# Patient Record
Sex: FEMALE | Race: WHITE | NOT HISPANIC OR LATINO | URBAN - NONMETROPOLITAN AREA
[De-identification: names, ages, dates, MRNs, and addresses within clinical notes are randomized per-mention and may not be internally consistent; named-entity substitution may affect disease eponyms.]

---

## 2024-05-14 ENCOUNTER — OFFICE (OUTPATIENT)
Dept: URBAN - NONMETROPOLITAN AREA CLINIC 1 | Facility: CLINIC | Age: 74
End: 2024-05-14
Payer: COMMERCIAL

## 2024-05-14 VITALS
DIASTOLIC BLOOD PRESSURE: 90 MMHG | HEIGHT: 66 IN | SYSTOLIC BLOOD PRESSURE: 144 MMHG | SYSTOLIC BLOOD PRESSURE: 150 MMHG | DIASTOLIC BLOOD PRESSURE: 96 MMHG | WEIGHT: 166 LBS | HEART RATE: 88 BPM

## 2024-05-14 DIAGNOSIS — I10 ESSENTIAL (PRIMARY) HYPERTENSION: ICD-10-CM

## 2024-05-14 DIAGNOSIS — N18.9 CHRONIC KIDNEY DISEASE, UNSPECIFIED: ICD-10-CM

## 2024-05-14 DIAGNOSIS — Z86.010 PERSONAL HISTORY OF COLONIC POLYPS: ICD-10-CM

## 2024-05-14 DIAGNOSIS — R19.8 OTHER SPECIFIED SYMPTOMS AND SIGNS INVOLVING THE DIGESTIVE S: ICD-10-CM

## 2024-05-14 DIAGNOSIS — K21.9 GASTRO-ESOPHAGEAL REFLUX DISEASE WITHOUT ESOPHAGITIS: ICD-10-CM

## 2024-05-14 DIAGNOSIS — F41.9 ANXIETY DISORDER, UNSPECIFIED: ICD-10-CM

## 2024-05-14 DIAGNOSIS — E03.9 HYPOTHYROIDISM, UNSPECIFIED: ICD-10-CM

## 2024-05-14 DIAGNOSIS — K92.1 MELENA: ICD-10-CM

## 2024-05-14 DIAGNOSIS — Z79.02 LONG TERM (CURRENT) USE OF ANTITHROMBOTICS/ANTIPLATELETS: ICD-10-CM

## 2024-05-14 DIAGNOSIS — E78.5 HYPERLIPIDEMIA, UNSPECIFIED: ICD-10-CM

## 2024-05-14 PROCEDURE — 99204 OFFICE O/P NEW MOD 45 MIN: CPT | Performed by: NURSE PRACTITIONER

## 2024-05-14 NOTE — SERVICEHPINOTES
She comes in today requesting colonoscopy.   she is having a lot of lower GI upset, described as intermittent diarrhea but also some constipation.  She has always had some trouble with IBS D, but her symptoms are worse these past several months.  She sees blood with her stool, especially after straining for bowel movements. She saw BRIANNE Barrios NP at the  and was scheduled to have a colonoscopy, but had troubles with the scheduling.             She does not complain of any upper GI symptoms.  No heartburn or dysphagia.  No dark stools or unintended weight loss.  Her chronic illnesses include anxiety, carotid artery stenosis, coronary artery disease, previous adenomatous colon polyps, GERD, hyperlipidemia, hypertension, h, ischemic heart disease, hyperparathyroidism, and chronic kidney disease.  She has had CABG, and takes Plavix.  She will be asked to hold it 5 days prior to her procedure.br
itz  When she saw Heather to schedule colonoscopy previously she was given cardiac clearance. 
br Dr. Saavedra's Note To File: Documented 04/02/2024 at 07:20 Patient was last evaluated in our office 12/11/2023. If there has been no significant clinical change cardiac risk for colonoscopy/EGD with dilatation and for stopping clopidogrel prior to the procedure is low but not zero. It should be noted that stopping anti-platelet agents as short time as feasible prior to the procedure is recommended. Clopidogrel should be re-initiated after the procedure as soon as feasible. Aspirin should be continued unless absolutely necessary to discontinue. At last visit there were no obvious modifiable risk factors that will reduce cardiac risk. The determination of whether the procedure benefit outweighs the procedure risk is left of the procedure . Patient can be re-evaluated in our office prior to proposed procedure if clinically indicated. 
br
br   EGD-dil/ colonoscopy 6/18/20 by Dr Catherine because of PHP and diarrhea-
br -EGDFINDINGS/POSTOPERATIVE DIAGNOSIS: No gross esophagitis. Minimal sliding hiatal hernia. Pinch at 39cm. No tissue disruption after 54 and 60 Hebrew dilation.
br-COLON FINDINGS/POSTOPERATIVE DIAGNOSIS: Single small polyp in the sigmoid region. No gross inflammatory changes. Medium internal hemorrhoids.
br-Final Pathologic Diagnosis:br  1.  colon and terminal ileum, random biopsies:br   Fragments of benign colonic and small intestinal mucosa. br  Fragments of small intestine show no significant histopathologic findings. br  Benign colonic mucosa shows mucosal prolapse-associated changes. br  2.  small sessile sigmoid colon polyp, endoscopic biopsy:br   Tubular adenoma, inflamed. br br

## 2024-05-14 NOTE — SERVICENOTES
Hold Plavix 5 days prior to procedure.
The bowel prep was prescribed and instructions were provided.
The risks for colonoscopy were discussed with her and she agrees to proceed.

## 2025-07-10 ENCOUNTER — OFFICE (OUTPATIENT)
Dept: URBAN - NONMETROPOLITAN AREA CLINIC 1 | Facility: CLINIC | Age: 75
End: 2025-07-10
Payer: MEDICARE

## 2025-07-10 VITALS
OXYGEN SATURATION: 97 % | WEIGHT: 158 LBS | SYSTOLIC BLOOD PRESSURE: 118 MMHG | HEART RATE: 75 BPM | DIASTOLIC BLOOD PRESSURE: 72 MMHG | HEIGHT: 63 IN

## 2025-07-10 DIAGNOSIS — R13.10 DYSPHAGIA, UNSPECIFIED: ICD-10-CM

## 2025-07-10 DIAGNOSIS — R93.5 ABNORMAL FINDINGS ON DIAGNOSTIC IMAGING OF OTHER ABDOMINAL R: ICD-10-CM

## 2025-07-10 PROCEDURE — 99214 OFFICE O/P EST MOD 30 MIN: CPT | Performed by: INTERNAL MEDICINE

## 2025-07-10 NOTE — SERVICENOTES
We discussed risks and benefits of EGD and Flex Sig
.  I explained the risk of bleeding, infection, perforation requiring emergent surgery as well as anesthesia related complications including heart attack, stroke, or pneumonia.  Patient voiced understanding and agrees to proceed.

## 2025-07-10 NOTE — SERVICEHPINOTES
Patient presents for lower abdominal and rectal pain which has been going on for the past year.  She is complains that this pain is constant.  Describes it as in her lower abdomen, rectum or vagina and she is seen 5 different doctors for evaluation of this pain.  She states it prevents her from sleeping.  It is at times severe.  She notices no blood with her bowel movements  Has no significant diarrhea or constipation.  Bowel movements have no effect on this pain..
br   She saw her gynecologist Dr. Kidd 1 year ago, she has seen a urologist and has been to the emergency room twice because of this pain.
br   While at [facility] Emergency room she is undergone 2 CT scans 06/02/2025 shows an unremarkable CT of the abdomen and pelvis, on 06/14/2025 she had a CT scan with contrast and there was suspected thickening at the rectosigmoid junction.
br   She also does complain of intermittent dysphagia.  Barium esophagram did show suggestion of distal esophageal peptic stricture with barium tablet  delayed getting through the GE junction

## 2025-08-21 ENCOUNTER — AMBULATORY SURGICAL CENTER (OUTPATIENT)
Dept: URBAN - NONMETROPOLITAN AREA SURGERY 1 | Facility: SURGERY | Age: 75
End: 2025-08-21
Payer: MEDICARE

## 2025-08-21 ENCOUNTER — OFFICE (OUTPATIENT)
Dept: URBAN - METROPOLITAN AREA PATHOLOGY 15 | Facility: PATHOLOGY | Age: 75
End: 2025-08-21
Payer: MEDICARE

## 2025-08-21 VITALS
DIASTOLIC BLOOD PRESSURE: 82 MMHG | SYSTOLIC BLOOD PRESSURE: 157 MMHG | DIASTOLIC BLOOD PRESSURE: 87 MMHG | OXYGEN SATURATION: 98 % | OXYGEN SATURATION: 95 % | OXYGEN SATURATION: 100 % | HEART RATE: 62 BPM | OXYGEN SATURATION: 95 % | TEMPERATURE: 98.3 F | SYSTOLIC BLOOD PRESSURE: 159 MMHG | RESPIRATION RATE: 13 BRPM | RESPIRATION RATE: 15 BRPM | HEART RATE: 58 BPM | TEMPERATURE: 98.2 F | WEIGHT: 159 LBS | OXYGEN SATURATION: 99 % | DIASTOLIC BLOOD PRESSURE: 77 MMHG | SYSTOLIC BLOOD PRESSURE: 159 MMHG | SYSTOLIC BLOOD PRESSURE: 153 MMHG | DIASTOLIC BLOOD PRESSURE: 72 MMHG | RESPIRATION RATE: 13 BRPM | HEART RATE: 61 BPM | HEART RATE: 56 BPM | SYSTOLIC BLOOD PRESSURE: 157 MMHG | SYSTOLIC BLOOD PRESSURE: 140 MMHG | HEART RATE: 62 BPM | DIASTOLIC BLOOD PRESSURE: 72 MMHG | RESPIRATION RATE: 17 BRPM | DIASTOLIC BLOOD PRESSURE: 87 MMHG | HEIGHT: 63 IN | SYSTOLIC BLOOD PRESSURE: 142 MMHG | HEIGHT: 63 IN | TEMPERATURE: 98.2 F | SYSTOLIC BLOOD PRESSURE: 165 MMHG | RESPIRATION RATE: 12 BRPM | SYSTOLIC BLOOD PRESSURE: 159 MMHG | SYSTOLIC BLOOD PRESSURE: 140 MMHG | DIASTOLIC BLOOD PRESSURE: 82 MMHG | HEART RATE: 56 BPM | SYSTOLIC BLOOD PRESSURE: 142 MMHG | SYSTOLIC BLOOD PRESSURE: 153 MMHG | HEART RATE: 53 BPM | RESPIRATION RATE: 12 BRPM | RESPIRATION RATE: 17 BRPM | TEMPERATURE: 98.3 F | TEMPERATURE: 98.2 F | SYSTOLIC BLOOD PRESSURE: 165 MMHG | HEART RATE: 62 BPM | DIASTOLIC BLOOD PRESSURE: 87 MMHG | WEIGHT: 159 LBS | SYSTOLIC BLOOD PRESSURE: 153 MMHG | OXYGEN SATURATION: 99 % | DIASTOLIC BLOOD PRESSURE: 88 MMHG | TEMPERATURE: 98.3 F | OXYGEN SATURATION: 95 % | DIASTOLIC BLOOD PRESSURE: 77 MMHG | DIASTOLIC BLOOD PRESSURE: 88 MMHG | HEIGHT: 63 IN | RESPIRATION RATE: 12 BRPM | OXYGEN SATURATION: 98 % | HEART RATE: 58 BPM | HEART RATE: 58 BPM | HEART RATE: 53 BPM | RESPIRATION RATE: 15 BRPM | HEART RATE: 61 BPM | WEIGHT: 159 LBS | OXYGEN SATURATION: 100 % | HEART RATE: 56 BPM | OXYGEN SATURATION: 98 % | DIASTOLIC BLOOD PRESSURE: 82 MMHG | SYSTOLIC BLOOD PRESSURE: 157 MMHG | RESPIRATION RATE: 15 BRPM | SYSTOLIC BLOOD PRESSURE: 142 MMHG | HEART RATE: 53 BPM | SYSTOLIC BLOOD PRESSURE: 140 MMHG | DIASTOLIC BLOOD PRESSURE: 72 MMHG | RESPIRATION RATE: 13 BRPM | OXYGEN SATURATION: 100 % | HEART RATE: 61 BPM | RESPIRATION RATE: 17 BRPM | SYSTOLIC BLOOD PRESSURE: 165 MMHG | DIASTOLIC BLOOD PRESSURE: 88 MMHG | OXYGEN SATURATION: 99 % | DIASTOLIC BLOOD PRESSURE: 77 MMHG

## 2025-08-21 DIAGNOSIS — K29.50 UNSPECIFIED CHRONIC GASTRITIS WITHOUT BLEEDING: ICD-10-CM

## 2025-08-21 DIAGNOSIS — R10.30 LOWER ABDOMINAL PAIN, UNSPECIFIED: ICD-10-CM

## 2025-08-21 DIAGNOSIS — R13.10 DYSPHAGIA, UNSPECIFIED: ICD-10-CM

## 2025-08-21 DIAGNOSIS — R93.3 ABNORMAL FINDINGS ON DIAGNOSTIC IMAGING OF OTHER PARTS OF DI: ICD-10-CM

## 2025-08-21 DIAGNOSIS — K44.9 DIAPHRAGMATIC HERNIA WITHOUT OBSTRUCTION OR GANGRENE: ICD-10-CM

## 2025-08-21 PROBLEM — K62.89 RECTAL PAIN: Status: ACTIVE | Noted: 2025-08-21

## 2025-08-21 PROCEDURE — 43248 EGD GUIDE WIRE INSERTION: CPT | Performed by: INTERNAL MEDICINE

## 2025-08-21 PROCEDURE — 43239 EGD BIOPSY SINGLE/MULTIPLE: CPT | Mod: 59 | Performed by: INTERNAL MEDICINE

## 2025-08-21 PROCEDURE — 88305 TISSUE EXAM BY PATHOLOGIST: CPT | Performed by: STUDENT IN AN ORGANIZED HEALTH CARE EDUCATION/TRAINING PROGRAM

## 2025-08-21 PROCEDURE — 45330 DIAGNOSTIC SIGMOIDOSCOPY: CPT | Mod: 51 | Performed by: INTERNAL MEDICINE

## 2025-08-21 PROCEDURE — 88313 SPECIAL STAINS GROUP 2: CPT | Performed by: STUDENT IN AN ORGANIZED HEALTH CARE EDUCATION/TRAINING PROGRAM

## 2025-08-21 PROCEDURE — 45330 DIAGNOSTIC SIGMOIDOSCOPY: CPT | Performed by: INTERNAL MEDICINE

## 2025-08-21 PROCEDURE — 88342 IMHCHEM/IMCYTCHM 1ST ANTB: CPT | Performed by: STUDENT IN AN ORGANIZED HEALTH CARE EDUCATION/TRAINING PROGRAM

## 2025-08-21 RX ADMIN — PROPOFOL 400 MG: 10 INJECTION, EMULSION INTRAVENOUS at 15:00

## 2025-08-26 LAB
GASTRO ONE PATHOLOGY: PDF REPORT: (no result)